# Patient Record
Sex: FEMALE | Race: WHITE | NOT HISPANIC OR LATINO | Employment: UNEMPLOYED | URBAN - METROPOLITAN AREA
[De-identification: names, ages, dates, MRNs, and addresses within clinical notes are randomized per-mention and may not be internally consistent; named-entity substitution may affect disease eponyms.]

---

## 2019-02-19 ENCOUNTER — HOSPITAL ENCOUNTER (OUTPATIENT)
Dept: GENERAL RADIOLOGY | Facility: HOSPITAL | Age: 49
Discharge: HOME | End: 2019-02-19
Attending: NURSE PRACTITIONER

## 2019-05-23 ENCOUNTER — HOSPITAL ENCOUNTER (OUTPATIENT)
Dept: GENERAL RADIOLOGY | Facility: HOSPITAL | Age: 49
Discharge: HOME | End: 2019-05-23
Attending: NURSE PRACTITIONER

## 2019-06-04 ENCOUNTER — OFFICE VISIT CONVERTED (OUTPATIENT)
Dept: NEUROSURGERY | Facility: CLINIC | Age: 49
End: 2019-06-04
Attending: PHYSICIAN ASSISTANT

## 2019-08-06 ENCOUNTER — OFFICE VISIT CONVERTED (OUTPATIENT)
Dept: NEUROSURGERY | Facility: CLINIC | Age: 49
End: 2019-08-06
Attending: PHYSICIAN ASSISTANT

## 2019-08-20 ENCOUNTER — HOSPITAL ENCOUNTER (OUTPATIENT)
Dept: GENERAL RADIOLOGY | Facility: HOSPITAL | Age: 49
Discharge: HOME | End: 2019-08-20
Attending: NURSE PRACTITIONER

## 2019-09-04 ENCOUNTER — OFFICE VISIT CONVERTED (OUTPATIENT)
Dept: SURGERY | Facility: CLINIC | Age: 49
End: 2019-09-04
Attending: SURGERY

## 2019-09-04 ENCOUNTER — CONVERSION ENCOUNTER (OUTPATIENT)
Dept: SURGERY | Facility: CLINIC | Age: 49
End: 2019-09-04

## 2019-09-06 ENCOUNTER — HOSPITAL ENCOUNTER (OUTPATIENT)
Dept: PERIOP | Facility: HOSPITAL | Age: 49
Setting detail: HOSPITAL OUTPATIENT SURGERY
Discharge: HOME | End: 2019-09-06
Attending: SURGERY

## 2019-09-18 ENCOUNTER — OFFICE VISIT CONVERTED (OUTPATIENT)
Dept: SURGERY | Facility: CLINIC | Age: 49
End: 2019-09-18
Attending: SURGERY

## 2019-09-18 ENCOUNTER — CONVERSION ENCOUNTER (OUTPATIENT)
Dept: SURGERY | Facility: CLINIC | Age: 49
End: 2019-09-18

## 2020-01-16 ENCOUNTER — HOSPITAL ENCOUNTER (OUTPATIENT)
Dept: GENERAL RADIOLOGY | Facility: HOSPITAL | Age: 50
Discharge: HOME | End: 2020-01-16
Attending: PHYSICIAN ASSISTANT

## 2020-03-09 ENCOUNTER — HOSPITAL ENCOUNTER (OUTPATIENT)
Dept: GENERAL RADIOLOGY | Facility: HOSPITAL | Age: 50
Discharge: HOME | End: 2020-03-09
Attending: NURSE PRACTITIONER

## 2021-04-08 ENCOUNTER — OFFICE VISIT CONVERTED (OUTPATIENT)
Dept: SURGERY | Facility: CLINIC | Age: 51
End: 2021-04-08
Attending: NURSE PRACTITIONER

## 2021-05-05 ENCOUNTER — HOSPITAL ENCOUNTER (OUTPATIENT)
Dept: GENERAL RADIOLOGY | Facility: HOSPITAL | Age: 51
Discharge: HOME | End: 2021-05-05
Attending: NURSE PRACTITIONER

## 2021-05-11 NOTE — H&P
History and Physical      Patient Name: Jazmine Baeza   Patient ID: [id number]   Sex: Female   YOB: 1970    Primary Care Provider: Herman Lazo MD   Referring Provider: Herman Lazo MD    Visit Date: April 8, 2021    Provider: ARIELLE Vazquez   [place] [facility]   Location Address: [address] [place] [address]  [id number]   Location Phone: [phone]          Chief Complaint  · Requesting colonoscopy  · Age 50 or over  · Here today for a pre-surgical colon screening visit      History Of Present Illness  The patient is a 50 year old /White female presenting to the Surgical Specialist office on a referral from Herman Lazo MD.   Jazmine Baeza needs to have a screening colonoscopy.   Patient states that they have not had a colonoscopy.   Patient currently complains of: no complaints   Patient Does not have family history of colon cancer.      Patient presents today on referral from Dr. Herman Lazo for screening colonoscopy.  Patient denies any abdominal pain, change in bowel habit, or rectal bleeding.  Denies any family history of colorectal cancer.  No previous colonoscopy.    Patient does report that her son has Crohn's disease.       Past Medical History  Disease Name Date Onset Notes   Cervical radiculopathy 08/06/2019 --    Cervicalgia --  --    Facet arthritis of cervical region 08/06/2019 --    Hypertension, Benign Essential --  --          Past Surgical History  Procedure Name Date Notes   Cholecystectomy --  --    Hysterectomy --  --          Medication List  Name Date Started Instructions   clonazepam 0.5 mg oral tablet  take 1 tablet (0.5 mg) by oral route 3 times per day   cyclobenzaprine 5 mg oral tablet  take 1 tablet (5 mg) by oral route 3 times per day   diclofenac sodium 50 mg oral tablet,delayed release (DR/EC)  take 1 tablet (50 mg) by oral route 2 times per day as needed   triamterene-hydrochlorothiazid 37.5-25 mg oral  "capsule  take 1 capsule by oral route once daily         Allergy List  Allergen Name Date Reaction Notes   PENICILLINS --  --  --        Allergies Reconciled  Family Medical History  Disease Name Relative/Age Notes   No family history of colorectal cancer  --    Family history of cancer Mother/   Mother         Social History  Finding Status Start/Stop Quantity Notes   Alcohol Never --/-- --  04/08/2021 - 08/06/2019 - does not drink   Homemaker --  --/-- --  --    lives with spouse --  --/-- --  --     --  --/-- --  --    Tobacco Former --/-- 0.5 PPD current some day smoker, 0.5 pack per day, smoked 6-10 years         Review of Systems  · Constitutional  o Denies  o : fever, chills  · Eyes  o Denies  o : yellowish discoloration of eyes  · HENT  o Denies  o : difficulty swallowing  · Cardiovascular  o Denies  o : chest pain, chest pain on exertion  · Respiratory  o Denies  o : shortness of breath  · Gastrointestinal  o Denies  o : nausea, vomiting, diarrhea, constipation  · Genitourinary  o Denies  o : abnormal color of urine  · Integument  o Denies  o : rash  · Neurologic  o Denies  o : tingling or numbness  · Musculoskeletal  o Denies  o : joint pain  · Endocrine  o Denies  o : weight gain, weight loss      Vitals  Date Time BP Position Site L\R Cuff Size HR RR TEMP (F) WT  HT  BMI kg/m2 BSA m2 O2 Sat FR L/min FiO2 HC       04/08/2021 08:36 AM       13  135lbs 4oz 5'  5.5\" 22.16 1.68             Physical Examination  · Constitutional  o Appearance  o : well developed, well-nourished, patient in no apparent distress  · Head and Face  o Head  o :   § Inspection  § : atraumatic, normocephalic  o Face  o :   § Inspection  § : no facial lesions  · Eyes  o Conjunctivae  o : conjunctivae normal  o Sclerae  o : sclerae white  · Neck  o Inspection/Palpation  o : normal appearance, no masses or tenderness, trachea midline  · Respiratory  o Respiratory Effort  o : breathing unlabored  · Skin and Subcutaneous " Tissue  o General Inspection  o : no lesions present, no areas of discoloration, skin turgor normal, texture normal  · Neurologic  o Mental Status Examination  o :   § Orientation  § : grossly oriented to person, place and time  § Attention  § : attention normal, concentration abilities normal  § Fund of Knowledge  § : fund of knowledge within normal limits, patient aware of current events  o Gait and Station  o : normal gait, able to stand without difficulty  · Psychiatric  o Judgement and Insight  o : judgment and insight intact  o Mood and Affect  o : mood normal, affect appropriate              Assessment  · Screening for colon cancer     V76.51/Z12.11    Problems Reconciled  Plan  · Orders  o Consent for Colonoscopy Screening-Possible risk/complications, benefits, and alternatives to surgical or invasive procedure have been explained to patient and/or legal guardian. -Patient has been evaluated and can tolerate anesthesia and/or sedation. Risks, benefits, and alternatives to anesthesia and sedation have been explained to patient and/or legal guardian. () - V76.51/Z12.11 - 05/14/2021  · Medications  o Medications have been Reconciled  o Transition of Care or Provider Policy  · Instructions  o Surgical Facility: [facility]  o Handouts Provided Pre-Procedure Instructions including date, time, and location of procedure.   o PLAN: Proceeed with colonoscopy. Patient understands risks/benefits and is willing to proceed.   o ***Surgical Orders***  o RISK AND BENEFITS:  o Given these options, the patient has verbally expressed an understanding of the risks of the surgery and finds these risks acceptable. Will proceed with surgery as soon as possible.  o O.R. PREP: Per protocol   o IV: Per Anesthesia  o Please sign permit for: Colonoscopy with possible biopsies by Dr. Malone.  o The above History and Physical Examination has been completed within 30 days of admission.  o ***Patient  Status***  o Outpatient  o Follow up in the in the office post procedure.  o Electronically Identified Patient Education Materials Provided Electronically  · Disposition  o EMR dragon/transcription disclaimer: Much of this encounter note is an electronic transcription/translation of spoken language to printed text. Electronic translation of spoken language may permit erroneous, or at times nonsensical words or phrases to be inadvertently trasncribed; although I have reviewed the note for such errors, some may still exist.  · Referrals  o ID: [id number] Date: 03/15/2021 Type: Inbound  Specialty: General Surgery            Electronically Signed by: ARIELLE Vazquez -Author on April 8, 2021 09:02:09 AM

## 2021-05-14 ENCOUNTER — HOSPITAL ENCOUNTER (OUTPATIENT)
Dept: GASTROENTEROLOGY | Facility: HOSPITAL | Age: 51
Setting detail: HOSPITAL OUTPATIENT SURGERY
Discharge: HOME | End: 2021-05-14
Attending: SURGERY

## 2021-05-14 VITALS — RESPIRATION RATE: 13 BRPM | WEIGHT: 135.25 LBS | BODY MASS INDEX: 22.53 KG/M2 | HEIGHT: 65 IN

## 2021-05-15 VITALS — BODY MASS INDEX: 22.42 KG/M2 | RESPIRATION RATE: 16 BRPM | WEIGHT: 131.31 LBS | HEIGHT: 64 IN

## 2021-05-15 VITALS — BODY MASS INDEX: 22.36 KG/M2 | HEIGHT: 64 IN | RESPIRATION RATE: 14 BRPM | WEIGHT: 131 LBS

## 2021-05-15 VITALS
HEART RATE: 87 BPM | DIASTOLIC BLOOD PRESSURE: 94 MMHG | OXYGEN SATURATION: 98 % | SYSTOLIC BLOOD PRESSURE: 149 MMHG | HEIGHT: 64 IN | WEIGHT: 135.5 LBS | BODY MASS INDEX: 23.13 KG/M2

## 2021-05-15 VITALS — HEIGHT: 64 IN | BODY MASS INDEX: 23.05 KG/M2 | HEART RATE: 75 BPM | WEIGHT: 135 LBS

## 2021-05-25 ENCOUNTER — OFFICE VISIT CONVERTED (OUTPATIENT)
Dept: SURGERY | Facility: CLINIC | Age: 51
End: 2021-05-25
Attending: SURGERY

## 2021-06-06 NOTE — PROGRESS NOTES
"   Progress Note      Patient Name: Jazmine Baeza   Patient ID: [id number]   Sex: Female   YOB: 1970    Primary Care Provider: Herman Lazo MD   Referring Provider: Herman Lazo MD    Visit Date: May 25, 2021    Provider: Isiah Malone MD   Location: [facility]   Location Address: [address] [place] [address]  [id number]   Location Phone: [phone]          Chief Complaint  · Follow Up Surgery      History Of Present Illness  Jazmine Baeza is a 50 year old /White female who presents today for a postoperative visit. She follows-up status post screening colonoscopy. The patient has done well after her procedure. She is not reporting any unexpected signs or symptoms. She is eating and drinking normally and having regular bowel movements.       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; ; Tobacco (Former)         Review of Systems  · Cardiovascular  o Denies  o : chest pain on exertion, shortness of breath, lower extremity swelling  · Respiratory  o Denies  o : shortness of breath, coughing up blood  · Gastrointestinal  o Denies  o : chronic abdominal pain      Vitals  Date Time BP Position Site L\R Cuff Size HR RR TEMP (F) WT  HT  BMI kg/m2 BSA m2 O2 Sat FR L/min FiO2        05/25/2021 03:43 PM         134lbs 2oz 5'  5.5\" 21.98 1.68             Physical " Examination  · Constitutional  o Appearance  o : well developed, well-nourished, alert and in no acute distress  · Head and Face  o Head  o :   § Inspection  § : no deformities or lesions  · Eyes  o Conjunctivae  o : clear  o Sclerae  o : nonicteric  · Neck  o Inspection/Palpation  o : normal appearance, no masses or tenderness, trachea midline  · Respiratory  o Respiratory Effort  o : breathing unlabored  o Inspection of Chest  o : normal appearance, no retractions  · Cardiovascular  o Heart  o : regular rate and rhythm  · Gastrointestinal  o Abdominal Examination  o :   § Abdomen  § : soft, nontender, nondistended  · Lymphatic  o Neck  o : no lymphadenopathy present  o Axilla  o : no lymphadenopathy present  o Groin  o : no lymphadenopathy present  · Skin and Subcutaneous Tissue  o General Inspection  o : no rashes present, no lesions present, no areas of discoloration  · Neurologic  o Cranial Nerves  o : grossly intact  o Sensation  o : grossly intact  o Gait and Station  o :   § Gait Screening  § : normal gait, able to stand without diffculty  o Cerebellar Function  o : no obvious abnormalities  · Psychiatric  o Judgement and Insight  o : judgment and insight intact  o Mood and Affect  o : mood normal, affect appropriate          Assessment  · Encounter for examination following surgery     V67.00/Z09       Patient status post screening colonoscopy with the findings of a hyperplastic polyp.       Plan  · Medications  o Medications have been Reconciled  o Transition of Care or Provider Policy  · Instructions  o Electronically Identified Patient Education Materials Provided Electronically  · Referrals  o ID: [id number] Date: 03/15/2021 Type: Inbound  Specialty: General Surgery     The patient is doing well. Given the findings of a polyp, even though it was a hyperplastic polyp, the patient feels more comfortable proceeding with a repeat screening colonoscopy in five years. We will plan for a repeat screening in five  years. Otherwise, she can follow-up with me as needed. I discussed all of this with the patient. All questions were answered.  She voiced understanding and agreed to proceed with the above plan.             Electronically Signed by: Kelsie Baez-, -Author on May 26, 2021 11:10:22 AM  Electronically Co-signed by: Isiah Malone MD -Reviewer on May 26, 2021 10:48:42 PM

## 2021-07-15 VITALS — WEIGHT: 134.12 LBS | HEIGHT: 65 IN | BODY MASS INDEX: 22.35 KG/M2

## 2021-12-30 ENCOUNTER — TRANSCRIBE ORDERS (OUTPATIENT)
Dept: ADMINISTRATIVE | Facility: HOSPITAL | Age: 51
End: 2021-12-30

## 2021-12-30 DIAGNOSIS — R10.84 GENERALIZED ABDOMINAL PAIN: Primary | ICD-10-CM

## 2022-01-03 ENCOUNTER — HOSPITAL ENCOUNTER (OUTPATIENT)
Dept: CT IMAGING | Facility: HOSPITAL | Age: 52
Discharge: HOME | End: 2022-01-03
Admitting: NURSE PRACTITIONER

## 2022-01-03 DIAGNOSIS — R10.84 GENERALIZED ABDOMINAL PAIN: ICD-10-CM

## 2022-01-03 PROCEDURE — 74177 CT ABD & PELVIS W/CONTRAST: CPT

## 2022-01-03 PROCEDURE — 0 IOPAMIDOL PER 1 ML: Performed by: NURSE PRACTITIONER

## 2022-01-03 RX ADMIN — IOPAMIDOL 100 ML: 755 INJECTION, SOLUTION INTRAVENOUS at 14:20

## 2022-01-11 ENCOUNTER — OFFICE VISIT (OUTPATIENT)
Dept: SURGERY | Facility: CLINIC | Age: 52
End: 2022-01-11

## 2022-01-11 VITALS — RESPIRATION RATE: 14 BRPM | HEIGHT: 66 IN | WEIGHT: 140 LBS | BODY MASS INDEX: 22.5 KG/M2

## 2022-01-11 DIAGNOSIS — L72.11 PILAR CYST: ICD-10-CM

## 2022-01-11 DIAGNOSIS — L72.3 SEBACEOUS CYST: Primary | ICD-10-CM

## 2022-01-11 PROCEDURE — 88304 TISSUE EXAM BY PATHOLOGIST: CPT | Performed by: SURGERY

## 2022-01-11 PROCEDURE — 99213 OFFICE O/P EST LOW 20 MIN: CPT | Performed by: SURGERY

## 2022-01-11 PROCEDURE — 11421 EXC H-F-NK-SP B9+MARG 0.6-1: CPT | Performed by: SURGERY

## 2022-01-11 RX ORDER — TRIAMTERENE AND HYDROCHLOROTHIAZIDE 37.5; 25 MG/1; MG/1
1 CAPSULE ORAL EVERY MORNING
COMMUNITY

## 2022-01-11 RX ORDER — CLONAZEPAM 0.5 MG/1
0.25 TABLET ORAL 2 TIMES DAILY PRN
COMMUNITY

## 2022-01-11 RX ORDER — IBUPROFEN 600 MG/1
600 TABLET ORAL EVERY 6 HOURS PRN
COMMUNITY

## 2022-01-11 NOTE — PROGRESS NOTES
General Surgery/Colorectal Surgery Note    Patient Name:  Jazmine Baeza  YOB: 1970  [id number]    Referring Provider: Herman Lazo MD      Patient Care Team:  Kathryn Pringle APRN as PCP - General (Nurse Practitioner)  Naveen Portillo MD as Consulting Physician (General Surgery)    Chief complaint cyst    Subjective .     History of present illness:    She has a history of sebaceous cyst and pilar cyst previous excised.  She has a 2-year history of a cyst to the back of her head which has increased in size.  Previous spontaneous drainage with decrease in size.  She is still having symptoms with pain with palpation at the area.  No fever.  No blood thinner use.  No tobacco use.      History:  Past Medical History:   Diagnosis Date   • Hypertension        Past Surgical History:   Procedure Laterality Date   • CHOLECYSTECTOMY     • HYSTERECTOMY         History reviewed. No pertinent family history.    Social History     Tobacco Use   • Smoking status: Never Smoker   • Smokeless tobacco: Never Used   Vaping Use   • Vaping Use: Never used   Substance Use Topics   • Alcohol use: Not on file   • Drug use: Not on file       Review of Systems  All systems were reviewed and negative except for:   Review of Systems   Constitutional: Negative for chills, fever and unexpected weight loss.   HENT: Negative for congestion, nosebleeds and voice change.    Eyes: Negative for blurred vision, double vision and discharge.   Respiratory: Negative for apnea, chest tightness and shortness of breath.    Cardiovascular: Negative for chest pain and leg swelling.   Gastrointestinal: No nausea vomiting diarrhea   Endocrine: Negative for cold intolerance and heat intolerance.   Genitourinary: Negative for dysuria, hematuria and urgency.   Musculoskeletal: Negative for back pain, joint swelling and neck pain.   Skin: Negative for color change and dry skin.  See HPI  Neurological: Negative for dizziness  and confusion.   Hematological: Negative for adenopathy.   Psychiatric/Behavioral: Negative for agitation and behavioral problems.     MEDS:  Prior to Admission medications    Medication Sig Start Date End Date Taking? Authorizing Provider   clonazePAM (KlonoPIN) 0.5 MG tablet Take 0.25 mg by mouth 2 (Two) Times a Day As Needed.   Yes Fahad Bliss MD   ibuprofen (ADVIL,MOTRIN) 600 MG tablet Take 600 mg by mouth Every 6 (Six) Hours As Needed for Mild Pain .   Yes Fahad Bliss MD   triamterene-hydrochlorothiazide (DYAZIDE) 37.5-25 MG per capsule Take 1 capsule by mouth Every Morning.   Yes Fahad Bliss MD        Allergies:  Penicillins    Objective     Vital Signs   Resp:  [14] 14    Physical Exam:     General Appearance:    Alert, cooperative, in no acute distress   Head:    Normocephalic, without obvious abnormality, atraumatic   Eyes:          Conjunctivae and sclerae normal, no icterus,     Ears:    Ears appear intact with no abnormalities noted   Throat:   No oral lesions, no thrush, oral mucosa moist   Neck:   No adenopathy, supple, trachea midline, no thyromegaly   Back:     No kyphosis present, no scoliosis present, no skin lesions,      erythema or scars, no tenderness to percussion or                   palpation,   range of motion normal   Lungs:     Clear to auscultation,respirations regular, even and                  unlabored    Heart:    Regular rhythm and normal rate, normal S1 and S2, no            murmur, no gallop, no rub, no click   Chest Wall:    No abnormalities observed   Abdomen:     Normal bowel sounds, no masses, no organomegaly, soft        non-tender, non-distended, no guarding, no rebound                tenderness   Rectal:        Extremities:   Moves all extremities well, no edema, no cyanosis, no             redness   Pulses:   Pulses palpable and equal bilaterally   Skin:   No bleeding, bruising or rash, noninflamed pilar cyst to the back of her head   Lymph  "nodes:   No palpable adenopathy   Neurologic:   A/o x 4 with no deficits       Results Review:   {Results Review:84713::\"I reviewed the patient's new clinical results.\"    LABS/IMAGING:  No results found for this or any previous visit.     Result Review :     Assessment/Plan     Posterior head pilar cyst    I recommended excision in the office.  I explained the procedure and recovery.  Benefits and alternatives discussed.  Risk procedure including risk of anesthesia, bleeding, infection, recurrence, need for more surgery were discussed.  All questions answered.  Consent obtained.    Procedure note  Preoperative and postoperative diagnosis pilar cyst, Surgeon Lindsey, procedure excision of 1 x 1 x 1 cm pilar cyst posterior scalp, anesthesia local, estimated blood loss 5 cc, specimens cyst, complications none, findings closure with nylon    She was instructed to call for any fever or concern for infection.  She may shower.  Follow-up with me in 1 week for suture removal.  All questions answered.  She agrees with the plan.  Thank for the consult.        This document has been electronically signed by Naveen Portillo MD  January 11, 2022 14:38 EST  "

## 2022-01-12 LAB
CYTO UR: NORMAL
LAB AP CASE REPORT: NORMAL
LAB AP CLINICAL INFORMATION: NORMAL
PATH REPORT.FINAL DX SPEC: NORMAL
PATH REPORT.GROSS SPEC: NORMAL

## 2022-01-18 ENCOUNTER — OFFICE VISIT (OUTPATIENT)
Dept: SURGERY | Facility: CLINIC | Age: 52
End: 2022-01-18

## 2022-01-18 VITALS — WEIGHT: 139 LBS | BODY MASS INDEX: 22.34 KG/M2 | RESPIRATION RATE: 14 BRPM | HEIGHT: 66 IN

## 2022-01-18 DIAGNOSIS — L72.11 PILAR CYST: Primary | ICD-10-CM

## 2022-01-18 PROCEDURE — 99024 POSTOP FOLLOW-UP VISIT: CPT | Performed by: SURGERY

## 2022-01-18 NOTE — PROGRESS NOTES
General Surgery/Colorectal Surgery Note    Patient Name:  Jazmine Baeza  YOB: 1970  [id number]    Referring Provider: Naveen Portillo, *      Patient Care Team:  Kathryn Pringle APRN as PCP - General (Nurse Practitioner)  Naveen Portillo MD as Consulting Physician (General Surgery)    Chief complaint follow-up surgery    Subjective .     History of present illness:    Status post excision of pilar cyst 1/11/2022. Pathology with pilar cyst.    She comes in for follow-up. No fever, erythema, drainage. She is pleased with her surgery.      History:  Past Medical History:   Diagnosis Date   • Hypertension        Past Surgical History:   Procedure Laterality Date   • CHOLECYSTECTOMY     • HYSTERECTOMY         No family history on file.    Social History     Tobacco Use   • Smoking status: Never Smoker   • Smokeless tobacco: Never Used   Vaping Use   • Vaping Use: Never used   Substance Use Topics   • Alcohol use: Not on file   • Drug use: Not on file       Review of Systems  All systems were reviewed and negative except for:   Review of Systems   Constitutional: Negative for chills, fever and unexpected weight loss.   HENT: Negative for congestion, nosebleeds and voice change.    Eyes: Negative for blurred vision, double vision and discharge.   Respiratory: Negative for apnea, chest tightness and shortness of breath.    Cardiovascular: Negative for chest pain and leg swelling.   Gastrointestinal:        See HPI   Endocrine: Negative for cold intolerance and heat intolerance.   Genitourinary: Negative for dysuria, hematuria and urgency.   Musculoskeletal: Negative for back pain, joint swelling and neck pain.   Skin: Negative for color change and dry skin.   Neurological: Negative for dizziness and confusion.   Hematological: Negative for adenopathy.   Psychiatric/Behavioral: Negative for agitation and behavioral problems.     MEDS:  Prior to Admission medications    Medication Sig  "Start Date End Date Taking? Authorizing Provider   clonazePAM (KlonoPIN) 0.5 MG tablet Take 0.25 mg by mouth 2 (Two) Times a Day As Needed.   Yes Fahad Bliss MD   ibuprofen (ADVIL,MOTRIN) 600 MG tablet Take 600 mg by mouth Every 6 (Six) Hours As Needed for Mild Pain .   Yes Fahad Bliss MD   triamterene-hydrochlorothiazide (DYAZIDE) 37.5-25 MG per capsule Take 1 capsule by mouth Every Morning.   Yes Fahad Bliss MD        Allergies:  Penicillins    Objective     Vital Signs   Resp:  [14] 14    Physical Exam: Incision clean dry intact no evidence of infection        Results Review:   {Results Review:29633::\"I reviewed the patient's new clinical results.\"    LABS/IMAGING:  Results for orders placed or performed in visit on 01/11/22   Tissue Pathology Exam    Specimen: Scalp; Tissue   Result Value Ref Range    Case Report       Surgical Pathology Report                         Case: [id number]                                  Authorizing Provider:  Naveen Portillo MD  Collected:           01/11/2022 11:17 AM          Ordering Location:     [facility]     Received:            01/11/2022 11:17 AM                                 [facility]                                                        Pathologist:           Nam Vincent MD                                                            Specimen:    Scalp, back of scalp                                                                       Clinical Information      Final Diagnosis       Skin, back of scalp, excision:   - Pilar cyst      Gross Description       Back of scalp.  Received in formalin is a ruptured pink to white ovoid 1.5 x 0.8 x 0.7 cm cystic structure.  No skin surface is identified grossly.  Sectioned, all 1A.  CRE      Microscopic Description          Result Review :     Assessment/Plan     Status post excision of pilar cyst 1/11/2022. Pathology with pilar cyst.    I reviewed the pathology with " the patient. Sutures removed. Activity as tolerated. Follow-up with me as needed. All questions answered. Thank you for the consult.              This document has been electronically signed by Naveen Portillo MD  January 18, 2022 09:06 EST

## 2023-03-20 ENCOUNTER — TRANSCRIBE ORDERS (OUTPATIENT)
Dept: ADMINISTRATIVE | Facility: HOSPITAL | Age: 53
End: 2023-03-20
Payer: OTHER GOVERNMENT

## 2023-03-20 DIAGNOSIS — Z12.31 SCREENING MAMMOGRAM FOR BREAST CANCER: Primary | ICD-10-CM

## 2023-08-03 ENCOUNTER — HOSPITAL ENCOUNTER (OUTPATIENT)
Dept: MRI IMAGING | Facility: HOSPITAL | Age: 53
Discharge: HOME | End: 2023-08-03
Payer: OTHER GOVERNMENT

## 2023-08-03 DIAGNOSIS — M54.6 PAIN IN THORACIC SPINE: ICD-10-CM

## 2023-08-03 DIAGNOSIS — M54.42 ACUTE BACK PAIN WITH SCIATICA, LEFT: ICD-10-CM

## 2023-08-03 DIAGNOSIS — M54.2 CERVICALGIA: ICD-10-CM

## 2023-08-03 PROCEDURE — 72148 MRI LUMBAR SPINE W/O DYE: CPT

## 2023-08-03 PROCEDURE — 72141 MRI NECK SPINE W/O DYE: CPT

## 2023-08-03 PROCEDURE — 72146 MRI CHEST SPINE W/O DYE: CPT

## 2023-10-19 ENCOUNTER — APPOINTMENT (OUTPATIENT)
Dept: GENERAL RADIOLOGY | Facility: HOSPITAL | Age: 53
End: 2023-10-19
Payer: OTHER GOVERNMENT

## 2023-10-19 ENCOUNTER — HOSPITAL ENCOUNTER (EMERGENCY)
Facility: HOSPITAL | Age: 53
Discharge: HOME | End: 2023-10-19
Attending: EMERGENCY MEDICINE
Payer: OTHER GOVERNMENT

## 2023-10-19 VITALS
TEMPERATURE: 98.7 F | BODY MASS INDEX: 23.31 KG/M2 | DIASTOLIC BLOOD PRESSURE: 88 MMHG | HEIGHT: 66 IN | WEIGHT: 145.06 LBS | HEART RATE: 70 BPM | RESPIRATION RATE: 16 BRPM | SYSTOLIC BLOOD PRESSURE: 166 MMHG | OXYGEN SATURATION: 99 %

## 2023-10-19 DIAGNOSIS — M54.12 CERVICAL RADICULOPATHY: Primary | ICD-10-CM

## 2023-10-19 LAB
QT INTERVAL: 379 MS
QTC INTERVAL: 420 MS

## 2023-10-19 PROCEDURE — 97140 MANUAL THERAPY 1/> REGIONS: CPT | Performed by: PHYSICAL THERAPIST

## 2023-10-19 PROCEDURE — 90472 IMMUNIZATION ADMIN EACH ADD: CPT

## 2023-10-19 PROCEDURE — 93005 ELECTROCARDIOGRAM TRACING: CPT

## 2023-10-19 PROCEDURE — 25010000002 HYDROMORPHONE 1 MG/ML SOLUTION: Performed by: EMERGENCY MEDICINE

## 2023-10-19 PROCEDURE — 93005 ELECTROCARDIOGRAM TRACING: CPT | Performed by: EMERGENCY MEDICINE

## 2023-10-19 PROCEDURE — 73030 X-RAY EXAM OF SHOULDER: CPT

## 2023-10-19 PROCEDURE — 99283 EMERGENCY DEPT VISIT LOW MDM: CPT

## 2023-10-19 PROCEDURE — 97161 PT EVAL LOW COMPLEX 20 MIN: CPT | Performed by: PHYSICAL THERAPIST

## 2023-10-19 PROCEDURE — 96372 THER/PROPH/DIAG INJ SC/IM: CPT

## 2023-10-19 RX ORDER — PREDNISONE 50 MG/1
50 TABLET ORAL DAILY
Qty: 7 TABLET | Refills: 0 | Status: SHIPPED | OUTPATIENT
Start: 2023-10-19

## 2023-10-19 RX ORDER — OXYCODONE AND ACETAMINOPHEN 7.5; 325 MG/1; MG/1
1 TABLET ORAL EVERY 6 HOURS PRN
Qty: 12 TABLET | Refills: 0 | Status: SHIPPED | OUTPATIENT
Start: 2023-10-19

## 2023-10-19 RX ADMIN — HYDROMORPHONE HYDROCHLORIDE 1 MG: 1 INJECTION, SOLUTION INTRAMUSCULAR; INTRAVENOUS; SUBCUTANEOUS at 14:11

## 2023-10-19 NOTE — ED PROVIDER NOTES
"Time: 3:12 PM EDT  Date of encounter:  10/19/2023  Independent Historian/Clinical History and Information was obtained by:   {Blank multiple:30509}    History is limited by: {Limited History:57065}    Chief Complaint: ***      History of Present Illness:  Patient is a 52 y.o. year old female who presents to the emergency department for evaluation of ***    HPI    Patient Care Team  Primary Care Provider: Kathryn Pringle APRN    Past Medical History:     Allergies   Allergen Reactions    Penicillins Swelling     Past Medical History:   Diagnosis Date    Hypertension      Past Surgical History:   Procedure Laterality Date    CHOLECYSTECTOMY      HYSTERECTOMY       History reviewed. No pertinent family history.    Home Medications:  Prior to Admission medications    Medication Sig Start Date End Date Taking? Authorizing Provider   clonazePAM (KlonoPIN) 0.5 MG tablet Take 0.25 mg by mouth 2 (Two) Times a Day As Needed.    ProviderFahad MD   ibuprofen (ADVIL,MOTRIN) 600 MG tablet Take 600 mg by mouth Every 6 (Six) Hours As Needed for Mild Pain .    ProviderFahad MD   triamterene-hydrochlorothiazide (DYAZIDE) 37.5-25 MG per capsule Take 1 capsule by mouth Every Morning.    ProviderFahad MD        Social History:   Social History     Tobacco Use    Smoking status: Never    Smokeless tobacco: Never   Vaping Use    Vaping Use: Never used         Review of Systems:  Review of Systems     Physical Exam:  /88   Pulse 70   Temp 98.7 °F (37.1 °C) (Oral)   Resp 16   Ht 166.4 cm (65.5\")   Wt 65.8 kg (145 lb 1 oz)   SpO2 99%   BMI 23.77 kg/m²     Physical Exam   ***          Procedures:  Procedures      Medical Decision Making:      Comorbidities that affect care:    {Comorbidities that affect care:69187}    External Notes reviewed:    {External Note review (Optional):60866}      The following orders were placed and all results were independently analyzed by me:  Orders Placed This " Encounter   Procedures    XR Shoulder 2+ View Right    PT Consult: Eval & Treat Functional Mobility Below Baseline    PT Plan of Care Cert / Re-Cert    ECG 12 Lead Other; right shoulder pain       Medications Given in the Emergency Department:  Medications   HYDROmorphone (DILAUDID) injection 1 mg (1 mg Intramuscular Given 10/19/23 1411)        ED Course:         Labs:    Lab Results (last 24 hours)       ** No results found for the last 24 hours. **             Imaging:    XR Shoulder 2+ View Right    Result Date: 10/19/2023  PROCEDURE: XR SHOULDER 2+ VW RIGHT  COMPARISON: None  INDICATIONS: RIGHT SHOULDER PAIN POST POSSIBLE INJURY WITH FALL 3-4 DAYS AGO  FINDINGS:  There is no acute fracture or dislocation.  The glenohumeral acromioclavicular joints are normal alignment.  Visualized portions of the right chest wall appear unremarkable.        1. No acute osseous abnormality of the right shoulder.      TAYLER BOND MD       Electronically Signed and Approved By: TAYLER BOND MD on 10/19/2023 at 13:39                Differential Diagnosis and Discussion:    {Differentials:96581}    {Independent Review of (Optional):00187}    MDM     {Critical Care:06759}    Patient Care Considerations:    {Considerations (Optional):63223}      Consultants/Shared Management Plan:    {Shared Management Plan (Optional):48564}    Social Determinants of Health:    {Social Determinants of Health (Optional):46143}      Disposition and Care Coordination:    {Admission consideration:96936}    {Discharge (Optional):50382}    Final diagnoses:   Cervical radiculopathy        ED Disposition       None            This medical record created using voice recognition software.                 Disposition and Care Coordination:    Discharged: The patient is suitable and stable for discharge with no need for consideration of observation or admission.    I have explained the patient´s condition, diagnoses and treatment plan based on the information available to me at this time. I have answered questions and addressed any concerns. The patient has a good  understanding of the patient´s diagnosis, condition, and treatment plan as can be expected at this point. The vital signs have been stable. The patient´s condition is stable and appropriate for discharge from the emergency department.      The patient will pursue further outpatient evaluation with the primary care physician or other designated or consulting physician as outlined in the discharge instructions. They are agreeable to this plan of care and follow-up instructions have been explained in detail. The patient has received these instructions in written format and have expressed an understanding of the discharge instructions. The patient is aware that any significant change in condition or worsening of symptoms should prompt an immediate return to this or the closest emergency department or call to 911.  I have explained discharge medications and the need for follow up with the patient/caretakers. This was also printed in the discharge instructions. Patient was discharged with the following medications and follow up:      Medication List        New Prescriptions      oxyCODONE-acetaminophen 7.5-325 MG per tablet  Commonly known as: PERCOCET  Take 1 tablet by mouth Every 6 (Six) Hours As Needed for Severe Pain.     predniSONE 50 MG tablet  Commonly known as: DELTASONE  Take 1 tablet by mouth Daily.               Where to Get Your Medications        These medications were sent to [facility]/pharmacy #[id number] - [place], [place] - [address] - [phone]  - [phone] FX  [address] [place] [address]      Hours: 24-hours Phone:  [phone]   oxyCODONE-acetaminophen 7.5-325 MG per tablet  predniSONE 50 MG tablet      Kathryn Pringle, APRN  [address] [address]  [phone]    Schedule an appointment as soon as possible for a visit         Final diagnoses:   Cervical radiculopathy        ED Disposition       ED Disposition   Discharge    Condition   Stable    Comment   --               This medical record created using voice recognition software.             Bro Price DO  10/25/23 2682

## 2023-10-19 NOTE — THERAPY EVALUATION
Patient Name: Jazmine Baeza  : 1970    MRN: [id number]                              Today's Date: 10/19/2023       Admit Date: 10/19/2023    Visit Dx:     ICD-10-CM ICD-9-CM   1. Cervical radiculopathy  M54.12 723.4     There is no problem list on file for this patient.    Past Medical History:   Diagnosis Date    Hypertension      Past Surgical History:   Procedure Laterality Date    CHOLECYSTECTOMY      HYSTERECTOMY        General Information       Row Name 10/19/23 1454          Physical Therapy Time and Intention    Document Type evaluation  -LR     Mode of Treatment individual therapy  -LR       Row Name 10/19/23 1454          General Information    Patient Profile Reviewed yes  -LR     Prior Level of Function independent:  -LR               User Key  (r) = Recorded By, (t) = Taken By, (c) = Cosigned By      Initials Name Provider Type    LR Manasa Fitzgerald, PT Physical Therapist                  History: Patient reports she has had issues with her cervical spine for a while now.  She states she has had a cervical MRI which shows compression at C6.  She is consulting with a neurosurgeon regarding this and the neurosurgeon has recommended surgery.  Patient reports she did do physical therapy.  Patient reports she has gotten injections in her neck to help with her pain.  She states on Monday she started experiencing pain in her right shoulder and up into her neck.  She is also getting numbness and tingling down to her fingertips.  She states the pain has never been this bad before and rates it at a 9/10.  She reports spasms in her bicep.  She has tried ice and heat but they are not helping.    Objective:  Posture: Forward head, rounded shoulders bilaterally    Palpation: Tender to palpation at cervical and upper thoracic spinous processes, right cervical and thoracic paraspinals, right upper trap, right levator, right pec    ROM:  Active Cervical ROM:  Flexion: 32°  Extension: 10°  L Side bending:  10°  R Side bending: 15°  L Rotation: 20°  R Rotation: 45°    Active Shoulder ROM: WNL B     Strength:  L Shoulder MMT:   R Shoulder MMT:  Flexion: NT   Flexion: 4  Abduction: NT   Abduction: 4  External Rotation: NT  External Rotation: 4  Internal Rotation: NT  Internal Rotation: 5    L Elbow MMT:   R Elbow MMT:  Flexion: NT   Flexion: 5  Extension: NT   Extension: 5    L Wrist MMT:    R Wrist MMT:  Flexion: NT   Flexion: 5  Extension: NT   Extension: 5    Normal R  strength    Special Tests:  Spurlings: positive on R  Cervical Distraction Test: positive  Encinas Feliz:  negative on R     Sensation: Numbness/tingling into right upper extremity to fingertips; decreased sensation at C8 dermatome on right    Assessment/Plan:   Pt presents with a diagnosis of neck and right arm pain and has signs and symptoms consistent with cervical radiculopathy to include decreased cervical ROM, numbness/tingling into right UE, and right UE weakness that are limiting her ability to sleep and use right arm.  Manual therapy was performed to cervical spine which did help relieve patient's pain.  Patient was reeducated on the stretches to perform for her neck at home.  She was also educated on using a TENS unit to help reduce pain.    Goals:   LTG 1: The patient will be independent in HEP in order to decrease pain and improve tolerance to functional activities.  STATUS: Met    Interventions:   Manual Therapy: Manual cervical distraction in supine, right upper trap stretch, soft tissue massage to right upper trap, levator, and cervical paraspinals    Therapeutic Exercises: Not performed    Modalities: Not performed     Outcome Measures       Row Name 10/19/23 1454          Optimal Instrument    Optimal Instrument Optimal - 3  -LR     Lying Flat 3  -LR     Reaching 3  -LR     Lifting 5  -LR     From the list, choose the 3 activities you would most like to be able to do without any difficulty Reaching;Lifting;Lying flat  -LR      Total Score Optimal - 3 11  -LR       Row Name 10/19/23 1454          Functional Assessment    Outcome Measure Options Optimal Instrument  -LR               User Key  (r) = Recorded By, (t) = Taken By, (c) = Cosigned By      Initials Name Provider Type    LR Manasa Fitzgerald, PT Physical Therapist                     Time Calculation:   PT Evaluation Complexity  History, PT Evaluation Complexity: 1-2 personal factors and/or comorbidities  Examination of Body Systems (PT Eval Complexity): 1-2 elements  Clinical Presentation (PT Evaluation Complexity): stable  Clinical Decision Making (PT Evaluation Complexity): low complexity  Overall Complexity (PT Evaluation Complexity): low complexity     PT Charges       Row Name 10/19/23 1456             Time Calculation    PT Received On 10/19/23  -LR         Time Calculation- PT    Total Timed Code Minutes- PT 36 minute(s)  -LR         Timed Charges    36500 - PT Manual Therapy Minutes 16  -LR         Untimed Charges    PT Eval/Re-eval Minutes 20  -LR         Total Minutes    Timed Charges Total Minutes 16  -LR      Untimed Charges Total Minutes 20  -LR       Total Minutes 36  -LR                User Key  (r) = Recorded By, (t) = Taken By, (c) = Cosigned By      Initials Name Provider Type    Manasa Paris, PT Physical Therapist                  Therapy Charges for Today       Code Description Service Date Service Provider Modifiers Qty    35317543402 HC PT MANUAL THERAPY EA 15 MIN 10/19/2023 Manasa Fitzgerald, PT GP 1    [id number] HC PT EVAL LOW COMPLEXITY 2 10/19/2023 Manasa Fitzgerald, PT GP 1            PT G-Codes  Outcome Measure Options: Optimal Instrument       Manasa Fitzgerald, PT  10/19/2023

## 2023-10-27 LAB
QT INTERVAL: 379 MS
QTC INTERVAL: 420 MS

## 2024-02-01 ENCOUNTER — TRANSCRIBE ORDERS (OUTPATIENT)
Dept: ADMINISTRATIVE | Facility: HOSPITAL | Age: 54
End: 2024-02-01
Payer: OTHER GOVERNMENT

## 2024-02-01 DIAGNOSIS — M47.812 SPONDYLOSIS WITHOUT MYELOPATHY OR RADICULOPATHY, CERVICAL REGION: Primary | ICD-10-CM

## 2024-05-16 ENCOUNTER — HOSPITAL ENCOUNTER (OUTPATIENT)
Dept: CT IMAGING | Facility: HOSPITAL | Age: 54
Discharge: HOME | End: 2024-05-16
Admitting: NEUROLOGICAL SURGERY
Payer: OTHER GOVERNMENT

## 2024-05-16 DIAGNOSIS — M47.812 SPONDYLOSIS WITHOUT MYELOPATHY OR RADICULOPATHY, CERVICAL REGION: ICD-10-CM

## 2024-05-16 PROCEDURE — 72125 CT NECK SPINE W/O DYE: CPT
